# Patient Record
Sex: MALE
[De-identification: names, ages, dates, MRNs, and addresses within clinical notes are randomized per-mention and may not be internally consistent; named-entity substitution may affect disease eponyms.]

---

## 2021-02-05 ENCOUNTER — HOSPITAL ENCOUNTER (OUTPATIENT)
Dept: HOSPITAL 93 - PPH VACUNA | Age: 81
Discharge: HOME | End: 2021-02-05
Attending: PEDIATRICS
Payer: COMMERCIAL

## 2021-02-05 DIAGNOSIS — Z23: Primary | ICD-10-CM

## 2021-10-01 ENCOUNTER — HOSPITAL ENCOUNTER (OUTPATIENT)
Dept: HOSPITAL 93 - PPH VACUNA | Age: 81
Discharge: HOME | End: 2021-10-01
Attending: PEDIATRICS
Payer: COMMERCIAL

## 2021-10-01 DIAGNOSIS — Z23: Primary | ICD-10-CM

## 2022-11-16 ENCOUNTER — HOSPITAL ENCOUNTER (INPATIENT)
Dept: HOSPITAL 93 - ER | Age: 82
LOS: 7 days | Discharge: HOME | DRG: 432 | End: 2022-11-23
Attending: SPECIALIST | Admitting: SPECIALIST
Payer: COMMERCIAL

## 2022-11-16 VITALS — BODY MASS INDEX: 17.72 KG/M2 | HEIGHT: 63 IN | WEIGHT: 100 LBS

## 2022-11-16 DIAGNOSIS — K70.30: Primary | ICD-10-CM

## 2022-11-16 DIAGNOSIS — K65.2: ICD-10-CM

## 2022-11-16 DIAGNOSIS — E72.20: ICD-10-CM

## 2022-11-16 DIAGNOSIS — E87.0: ICD-10-CM

## 2022-11-16 DIAGNOSIS — K76.7: ICD-10-CM

## 2022-11-16 DIAGNOSIS — K76.82: ICD-10-CM

## 2022-11-16 DIAGNOSIS — Z66: ICD-10-CM

## 2022-11-16 DIAGNOSIS — C67.9: ICD-10-CM

## 2022-11-16 DIAGNOSIS — E87.6: ICD-10-CM

## 2022-11-16 DIAGNOSIS — N30.81: ICD-10-CM

## 2022-11-16 DIAGNOSIS — D63.8: ICD-10-CM

## 2022-11-16 DIAGNOSIS — G93.49: ICD-10-CM

## 2022-11-16 NOTE — NUR
SE CATALINA MUESTRAS DE [facility]. SE COLOCA H/ LA
PTE Y SE COLOCA IV FLUIDS. SE NOTIFICA ESTUDIO DE CT PENDIENTE A REALIZAR.

## 2022-11-16 NOTE — NUR
SE RECIBE PTE DESORIENTADO EN AMBULANCIA, PARAMEDICOS REFIEREN TRAER A PTE POR
FALTA DE APETITO DESDE HACE LIBIA SEMANA. SE MIDEN S/V A PTE Y SE COLOCA EN
KENZIE.

## 2022-11-17 NOTE — NUR
PACIENTE ALERTA Y DESORIENTADO X3. EN K[place] CON BARANDAS ELEVADAS Y
ACOMPANADO POR FAMILIAR. SE ORIENTA A FAMILIAR SOBRE TX A RECIBIR Y REFIRIO
ENTENDER. SE ADMINISTRA MEDICAMENTOS ORDENADOS POR MD. SE OFRECE CAMBIO DE
PANAL, EVACUACION PASTOSA COLOR VASQUEZ CATARINO. SE MANTIENE BAJO OBSERVACION POR
CAMBIOS SIGNIFICATIVOS.

## 2022-11-17 NOTE — NUR
SE RECIBE PTE MASCULINO DE 82 YRS ALERTA CONCIENTE Y TRANQQUILO EN K[place] CON
BARANDAS ELEVADA. PTE SE MANTIENE AL MOMENTO JHOANA DE DOLOR. SE OBSERVA CON
IVF'S PATENTE Y CONSULTADO CON EL CARO ELIZABETH.

## 2022-11-17 NOTE — NUR
SE REALIZA CANALIZACION BAJO MEDIDAS ASEPTICAS EN MANO DERECHA, AREA PATENTE Y
JHOANA DE EDEMA Y ERITEMA.  SE OFRECE CAMBIO DE PANAL.

## 2022-12-02 ENCOUNTER — HOSPITAL ENCOUNTER (INPATIENT)
Dept: HOSPITAL 93 - ER | Age: 82
LOS: 9 days | Discharge: HOME | DRG: 442 | End: 2022-12-11
Attending: SPECIALIST | Admitting: SPECIALIST
Payer: COMMERCIAL

## 2022-12-02 VITALS — BODY MASS INDEX: 19.49 KG/M2 | HEIGHT: 65 IN | WEIGHT: 117 LBS

## 2022-12-02 DIAGNOSIS — N17.8: ICD-10-CM

## 2022-12-02 DIAGNOSIS — E72.20: ICD-10-CM

## 2022-12-02 DIAGNOSIS — N39.0: ICD-10-CM

## 2022-12-02 DIAGNOSIS — I12.9: ICD-10-CM

## 2022-12-02 DIAGNOSIS — D61.818: ICD-10-CM

## 2022-12-02 DIAGNOSIS — F10.20: ICD-10-CM

## 2022-12-02 DIAGNOSIS — B96.20: ICD-10-CM

## 2022-12-02 DIAGNOSIS — K76.82: Primary | ICD-10-CM

## 2022-12-02 DIAGNOSIS — C61: ICD-10-CM

## 2022-12-02 DIAGNOSIS — R41.82: ICD-10-CM

## 2022-12-02 DIAGNOSIS — E86.0: ICD-10-CM

## 2022-12-02 DIAGNOSIS — K70.31: ICD-10-CM

## 2022-12-02 DIAGNOSIS — N30.81: ICD-10-CM

## 2022-12-02 DIAGNOSIS — N18.9: ICD-10-CM

## 2022-12-02 DIAGNOSIS — D63.1: ICD-10-CM

## 2022-12-02 DIAGNOSIS — E87.8: ICD-10-CM

## 2022-12-02 PROCEDURE — BW21YZZ COMPUTERIZED TOMOGRAPHY (CT SCAN) OF ABDOMEN AND PELVIS USING OTHER CONTRAST: ICD-10-PCS

## 2022-12-07 PROCEDURE — 02HV33Z INSERTION OF INFUSION DEVICE INTO SUPERIOR VENA CAVA, PERCUTANEOUS APPROACH: ICD-10-PCS | Performed by: SPECIALIST

## 2022-12-08 PROCEDURE — 0W9G3ZX DRAINAGE OF PERITONEAL CAVITY, PERCUTANEOUS APPROACH, DIAGNOSTIC: ICD-10-PCS | Performed by: RADIOLOGY

## 2022-12-08 PROCEDURE — 30233N1 TRANSFUSION OF NONAUTOLOGOUS RED BLOOD CELLS INTO PERIPHERAL VEIN, PERCUTANEOUS APPROACH: ICD-10-PCS | Performed by: SPECIALIST

## 2023-01-05 ENCOUNTER — HOSPITAL ENCOUNTER (INPATIENT)
Dept: HOSPITAL 93 - ER | Age: 83
LOS: 13 days | Discharge: HOME | DRG: 433 | End: 2023-01-18
Attending: GENERAL PRACTICE | Admitting: GENERAL PRACTICE
Payer: COMMERCIAL

## 2023-01-05 VITALS — WEIGHT: 112 LBS | BODY MASS INDEX: 21.99 KG/M2 | HEIGHT: 60 IN

## 2023-01-05 DIAGNOSIS — R78.81: ICD-10-CM

## 2023-01-05 DIAGNOSIS — N18.9: ICD-10-CM

## 2023-01-05 DIAGNOSIS — D64.89: ICD-10-CM

## 2023-01-05 DIAGNOSIS — N39.0: ICD-10-CM

## 2023-01-05 DIAGNOSIS — K70.31: Primary | ICD-10-CM

## 2023-01-05 DIAGNOSIS — B96.29: ICD-10-CM

## 2023-01-05 DIAGNOSIS — D69.59: ICD-10-CM

## 2023-01-05 DIAGNOSIS — N17.8: ICD-10-CM

## 2023-01-05 DIAGNOSIS — C61: ICD-10-CM

## 2023-01-05 DIAGNOSIS — D61.818: ICD-10-CM

## 2023-01-05 DIAGNOSIS — R74.01: ICD-10-CM

## 2023-01-05 DIAGNOSIS — Z20.822: ICD-10-CM

## 2023-01-05 DIAGNOSIS — E72.20: ICD-10-CM

## 2023-01-05 DIAGNOSIS — K76.82: ICD-10-CM

## 2023-01-05 PROCEDURE — BW28ZZZ COMPUTERIZED TOMOGRAPHY (CT SCAN) OF HEAD: ICD-10-PCS | Performed by: STUDENT IN AN ORGANIZED HEALTH CARE EDUCATION/TRAINING PROGRAM

## 2023-01-05 PROCEDURE — BW21ZZZ COMPUTERIZED TOMOGRAPHY (CT SCAN) OF ABDOMEN AND PELVIS: ICD-10-PCS | Performed by: STUDENT IN AN ORGANIZED HEALTH CARE EDUCATION/TRAINING PROGRAM

## 2023-01-07 PROCEDURE — BB24ZZZ COMPUTERIZED TOMOGRAPHY (CT SCAN) OF BILATERAL LUNGS: ICD-10-PCS | Performed by: GENERAL PRACTICE

## 2023-01-09 PROCEDURE — 30233R1 TRANSFUSION OF NONAUTOLOGOUS PLATELETS INTO PERIPHERAL VEIN, PERCUTANEOUS APPROACH: ICD-10-PCS | Performed by: GENERAL PRACTICE

## 2023-01-09 PROCEDURE — 02HV33Z INSERTION OF INFUSION DEVICE INTO SUPERIOR VENA CAVA, PERCUTANEOUS APPROACH: ICD-10-PCS | Performed by: GENERAL PRACTICE

## 2023-01-11 PROCEDURE — 0W9G3ZZ DRAINAGE OF PERITONEAL CAVITY, PERCUTANEOUS APPROACH: ICD-10-PCS | Performed by: RADIOLOGY

## 2023-01-12 PROCEDURE — 30233N1 TRANSFUSION OF NONAUTOLOGOUS RED BLOOD CELLS INTO PERIPHERAL VEIN, PERCUTANEOUS APPROACH: ICD-10-PCS | Performed by: GENERAL PRACTICE

## 2023-01-20 ENCOUNTER — HOSPITAL ENCOUNTER (EMERGENCY)
Dept: HOSPITAL 93 - ER | Age: 83
LOS: 1 days | Discharge: HOME | End: 2023-01-21
Payer: COMMERCIAL

## 2023-01-20 VITALS — HEIGHT: 67 IN | WEIGHT: 168 LBS | BODY MASS INDEX: 26.37 KG/M2

## 2023-01-20 DIAGNOSIS — N18.9: ICD-10-CM

## 2023-01-20 DIAGNOSIS — D69.6: Primary | ICD-10-CM

## 2023-01-20 DIAGNOSIS — Z85.51: ICD-10-CM

## 2023-01-20 DIAGNOSIS — Z85.46: ICD-10-CM

## 2023-01-20 DIAGNOSIS — K70.31: ICD-10-CM

## 2023-01-27 ENCOUNTER — HOSPITAL ENCOUNTER (INPATIENT)
Dept: HOSPITAL 93 - ER | Age: 83
LOS: 26 days | DRG: 207 | End: 2023-02-22
Attending: GENERAL PRACTICE | Admitting: GENERAL PRACTICE
Payer: COMMERCIAL

## 2023-01-27 VITALS — HEIGHT: 64 IN | WEIGHT: 150 LBS | BODY MASS INDEX: 25.61 KG/M2

## 2023-01-27 DIAGNOSIS — K70.31: ICD-10-CM

## 2023-01-27 DIAGNOSIS — B96.89: ICD-10-CM

## 2023-01-27 DIAGNOSIS — G93.40: ICD-10-CM

## 2023-01-27 DIAGNOSIS — J96.01: Primary | ICD-10-CM

## 2023-01-27 DIAGNOSIS — N17.9: ICD-10-CM

## 2023-01-27 DIAGNOSIS — K76.82: ICD-10-CM

## 2023-01-27 DIAGNOSIS — K76.7: ICD-10-CM

## 2023-01-27 DIAGNOSIS — L89.202: ICD-10-CM

## 2023-01-27 DIAGNOSIS — D69.59: ICD-10-CM

## 2023-01-27 DIAGNOSIS — Z74.01: ICD-10-CM

## 2023-01-27 DIAGNOSIS — E87.3: ICD-10-CM

## 2023-01-27 DIAGNOSIS — Z85.46: ICD-10-CM

## 2023-01-27 DIAGNOSIS — J15.6: ICD-10-CM

## 2023-01-27 DIAGNOSIS — D64.9: ICD-10-CM

## 2023-01-27 DIAGNOSIS — K72.10: ICD-10-CM

## 2023-01-27 DIAGNOSIS — R64: ICD-10-CM

## 2023-01-27 DIAGNOSIS — B37.9: ICD-10-CM

## 2023-01-27 DIAGNOSIS — J69.0: ICD-10-CM

## 2023-01-27 PROCEDURE — 0BH17EZ INSERTION OF ENDOTRACHEAL AIRWAY INTO TRACHEA, VIA NATURAL OR ARTIFICIAL OPENING: ICD-10-PCS | Performed by: STUDENT IN AN ORGANIZED HEALTH CARE EDUCATION/TRAINING PROGRAM

## 2023-01-27 PROCEDURE — 5A1955Z RESPIRATORY VENTILATION, GREATER THAN 96 CONSECUTIVE HOURS: ICD-10-PCS | Performed by: STUDENT IN AN ORGANIZED HEALTH CARE EDUCATION/TRAINING PROGRAM

## 2023-01-28 PROCEDURE — 05HY33Z INSERTION OF INFUSION DEVICE INTO UPPER VEIN, PERCUTANEOUS APPROACH: ICD-10-PCS | Performed by: INTERNAL MEDICINE

## 2023-01-29 PROCEDURE — 30243R1 TRANSFUSION OF NONAUTOLOGOUS PLATELETS INTO CENTRAL VEIN, PERCUTANEOUS APPROACH: ICD-10-PCS | Performed by: GENERAL PRACTICE

## 2023-01-30 PROCEDURE — 30243N1 TRANSFUSION OF NONAUTOLOGOUS RED BLOOD CELLS INTO CENTRAL VEIN, PERCUTANEOUS APPROACH: ICD-10-PCS | Performed by: GENERAL PRACTICE

## 2023-01-30 PROCEDURE — B54DZZZ ULTRASONOGRAPHY OF BILATERAL LOWER EXTREMITY VEINS: ICD-10-PCS | Performed by: INTERNAL MEDICINE

## 2023-01-30 PROCEDURE — 8E0ZXY6 ISOLATION: ICD-10-PCS | Performed by: INTERNAL MEDICINE

## 2023-02-04 PROCEDURE — 4A12X4Z MONITORING OF CARDIAC ELECTRICAL ACTIVITY, EXTERNAL APPROACH: ICD-10-PCS | Performed by: GENERAL PRACTICE

## 2023-02-13 PROCEDURE — 0W9G3ZZ DRAINAGE OF PERITONEAL CAVITY, PERCUTANEOUS APPROACH: ICD-10-PCS | Performed by: RADIOLOGY

## 2023-02-17 PROCEDURE — 0HD6XZZ EXTRACTION OF BACK SKIN, EXTERNAL APPROACH: ICD-10-PCS | Performed by: SURGERY
